# Patient Record
Sex: FEMALE | Race: WHITE | NOT HISPANIC OR LATINO
[De-identification: names, ages, dates, MRNs, and addresses within clinical notes are randomized per-mention and may not be internally consistent; named-entity substitution may affect disease eponyms.]

---

## 2021-08-24 ENCOUNTER — APPOINTMENT (OUTPATIENT)
Dept: OBGYN | Facility: CLINIC | Age: 51
End: 2021-08-24
Payer: COMMERCIAL

## 2021-08-24 ENCOUNTER — NON-APPOINTMENT (OUTPATIENT)
Age: 51
End: 2021-08-24

## 2021-08-24 VITALS — SYSTOLIC BLOOD PRESSURE: 118 MMHG | DIASTOLIC BLOOD PRESSURE: 78 MMHG | WEIGHT: 178 LBS

## 2021-08-24 PROCEDURE — 99396 PREV VISIT EST AGE 40-64: CPT

## 2021-08-24 NOTE — PLAN
[FreeTextEntry1] : 51 year y/o P1 presenting for annual exam\par -normal exam except for small right-sided hernia\par -unable to do internal exam today as patient has her period\par -Contraception: none\par -f/u PRN

## 2021-08-24 NOTE — PHYSICAL EXAM
[Appropriately responsive] : appropriately responsive [Alert] : alert [No Acute Distress] : no acute distress [No Lymphadenopathy] : no lymphadenopathy [Regular Rate Rhythm] : regular rate rhythm [No Murmurs] : no murmurs [Clear to Auscultation B/L] : clear to auscultation bilaterally [Soft] : soft [Non-tender] : non-tender [Non-distended] : non-distended [No HSM] : No HSM [No Lesions] : no lesions [Oriented x3] : oriented x3 [Examination Of The Breasts] : a normal appearance [No Masses] : no breast masses were palpable [Labia Majora] : normal [Labia Minora] : normal [Normal] : normal [Uterine Adnexae] : normal [FreeTextEntry7] : small right-sided hernia

## 2021-08-24 NOTE — HISTORY OF PRESENT ILLNESS
[Patient reported mammogram was normal] : Patient reported mammogram was normal [perimenopausal] : perimenopausal [N] : Patient reports normal menses [Frequency: Q ___ days] : menstrual periods occur approximately every [unfilled] days [No] : Patient does not have concerns regarding sex [Women] : women [Mammogramdate] : 6/2021 [TextBox_102] : 24-35 day cycle [MensesFreq] : 2 [PGxTotal] : 1 [[facility]] : [facility] [TextBox_6] : 08/24/21 [FreeTextEntry1] : 08/24/21

## 2021-09-23 ENCOUNTER — APPOINTMENT (OUTPATIENT)
Dept: OBGYN | Facility: CLINIC | Age: 51
End: 2021-09-23
Payer: COMMERCIAL

## 2021-09-23 VITALS — DIASTOLIC BLOOD PRESSURE: 83 MMHG | WEIGHT: 180 LBS | SYSTOLIC BLOOD PRESSURE: 110 MMHG

## 2021-09-23 DIAGNOSIS — Z78.9 OTHER SPECIFIED HEALTH STATUS: ICD-10-CM

## 2021-09-23 DIAGNOSIS — Z80.9 FAMILY HISTORY OF MALIGNANT NEOPLASM, UNSPECIFIED: ICD-10-CM

## 2021-09-23 DIAGNOSIS — Z86.018 PERSONAL HISTORY OF OTHER BENIGN NEOPLASM: ICD-10-CM

## 2021-09-23 DIAGNOSIS — Z82.49 FAMILY HISTORY OF ISCHEMIC HEART DISEASE AND OTHER DISEASES OF THE CIRCULATORY SYSTEM: ICD-10-CM

## 2021-09-23 DIAGNOSIS — K43.9 VENTRAL HERNIA W/OUT OBSTRUCTION OR GANGRENE: ICD-10-CM

## 2021-09-23 DIAGNOSIS — Z83.42 FAMILY HISTORY OF FAMILIAL HYPERCHOLESTEROLEMIA: ICD-10-CM

## 2021-09-23 DIAGNOSIS — Z83.79 FAMILY HISTORY OF OTHER DISEASES OF THE DIGESTIVE SYSTEM: ICD-10-CM

## 2021-09-23 PROCEDURE — XXXXX: CPT

## 2021-09-23 PROCEDURE — 99396 PREV VISIT EST AGE 40-64: CPT

## 2021-09-26 LAB
HPV 16 E6+E7 MRNA CVX QL NAA+PROBE: NOT DETECTED
HPV HIGH+LOW RISK DNA PNL CVX: NOT DETECTED
HPV18+45 E6+E7 MRNA CVX QL NAA+PROBE: NOT DETECTED

## 2021-09-29 LAB — CYTOLOGY CVX/VAG DOC THIN PREP: NORMAL

## 2021-10-29 ENCOUNTER — APPOINTMENT (OUTPATIENT)
Dept: TRAUMA SURGERY | Facility: HOSPITAL | Age: 51
End: 2021-10-29
Payer: COMMERCIAL

## 2021-10-29 ENCOUNTER — RESULT REVIEW (OUTPATIENT)
Age: 51
End: 2021-10-29

## 2021-10-29 VITALS
BODY MASS INDEX: 29.32 KG/M2 | SYSTOLIC BLOOD PRESSURE: 111 MMHG | HEART RATE: 72 BPM | WEIGHT: 176 LBS | DIASTOLIC BLOOD PRESSURE: 68 MMHG | TEMPERATURE: 98.1 F | HEIGHT: 65 IN

## 2021-10-29 PROCEDURE — 99214 OFFICE O/P EST MOD 30 MIN: CPT

## 2021-10-29 NOTE — PLAN
[FreeTextEntry1] : Mrs. Guajardo presents with complaints consistent with a ventral hernia likely due to her prior  or congenital weakness. I outlined the the risks and benefits of a diagnostic laparoscopy and hernia repair w/ mesh following CT abd/pel. I described bleeding, infection requiring mesh removal and possible nerve injury.  I also outlined the alternative of waiting/watching for more serious side effects however she says this discomfort has been limiting her ability to safely exercise and she is afraid this will become a large problem in the future.   I agree with her in that this should be fixed ASAP so to avoid complications of hernia as well fixing this while it is as small as possible. I outline the use of mesh as well. \par Mrs. Guajardo is agreeable to surgery so we will go ahead with pre-op imaging and will schedule surgery for the end of November or beginning of December. All questions were answered at this time. \par \par I spent 35min reviewing data,and information. Greater than 50% of my time was spent in face to face discussion regarding wound healing, postoperative activity and surgical planning. \par \par Velasquez Dueñas MD\par Acute Care Surgery\par

## 2021-10-29 NOTE — PHYSICAL EXAM
[de-identified] : Well ,comfortable. [de-identified] : Soft, NT, ND. No palpable ventral defect on coughing. No femoral/inguinal hernia felt. C section scar well healed, no palpable defect over this location.

## 2021-10-29 NOTE — HISTORY OF PRESENT ILLNESS
[de-identified] : Mrs. Guajardo is a pleasant 51 F with no pertinent past medical history and a past surgical history of a  11 years ago who presents with chronic right sided discomfort most pronounced when supine and while coughing.  She notice a bulge over her right side and felt relief by raising her legs to her abdomen. This was not associated with nausea/vomiting. She denies any other associated pain or symptom. She had a normal colonoscopy 2 years ago and has received both of her COVID vaccinations.

## 2021-11-24 ENCOUNTER — OUTPATIENT (OUTPATIENT)
Dept: OUTPATIENT SERVICES | Facility: HOSPITAL | Age: 51
LOS: 1 days | End: 2021-11-24
Payer: COMMERCIAL

## 2021-11-24 ENCOUNTER — APPOINTMENT (OUTPATIENT)
Dept: CT IMAGING | Facility: IMAGING CENTER | Age: 51
End: 2021-11-24
Payer: COMMERCIAL

## 2021-11-24 ENCOUNTER — TRANSCRIPTION ENCOUNTER (OUTPATIENT)
Age: 51
End: 2021-11-24

## 2021-11-24 DIAGNOSIS — Z00.8 ENCOUNTER FOR OTHER GENERAL EXAMINATION: ICD-10-CM

## 2021-11-24 PROCEDURE — 74177 CT ABD & PELVIS W/CONTRAST: CPT

## 2021-11-24 PROCEDURE — 74177 CT ABD & PELVIS W/CONTRAST: CPT | Mod: 26

## 2021-11-29 ENCOUNTER — NON-APPOINTMENT (OUTPATIENT)
Age: 51
End: 2021-11-29

## 2022-01-06 ENCOUNTER — APPOINTMENT (OUTPATIENT)
Dept: ANTEPARTUM | Facility: CLINIC | Age: 52
End: 2022-01-06
Payer: COMMERCIAL

## 2022-01-06 ENCOUNTER — APPOINTMENT (OUTPATIENT)
Dept: OBGYN | Facility: CLINIC | Age: 52
End: 2022-01-06
Payer: COMMERCIAL

## 2022-01-06 VITALS
WEIGHT: 178 LBS | SYSTOLIC BLOOD PRESSURE: 125 MMHG | BODY MASS INDEX: 29.66 KG/M2 | DIASTOLIC BLOOD PRESSURE: 75 MMHG | HEIGHT: 65 IN

## 2022-01-06 PROCEDURE — 76376 3D RENDER W/INTRP POSTPROCES: CPT

## 2022-01-06 PROCEDURE — 76830 TRANSVAGINAL US NON-OB: CPT

## 2022-01-06 PROCEDURE — 76857 US EXAM PELVIC LIMITED: CPT

## 2022-01-06 PROCEDURE — ZZZZZ: CPT

## 2022-01-29 PROBLEM — Z82.49 FH: HTN (HYPERTENSION): Status: ACTIVE | Noted: 2021-07-22

## 2022-01-29 PROBLEM — Z82.49 FHX: HEART DISEASE: Status: ACTIVE | Noted: 2021-07-22

## 2022-01-29 PROBLEM — Z83.42 FAMILY HISTORY OF HYPERCHOLESTEROLEMIA: Status: ACTIVE | Noted: 2022-01-29

## 2022-01-29 PROBLEM — Z80.9 FAMILY HISTORY OF MALIGNANT NEOPLASM: Status: ACTIVE | Noted: 2021-07-22

## 2022-01-29 PROBLEM — Z83.79 FAMILY HISTORY OF LIVER DISEASE: Status: ACTIVE | Noted: 2022-01-29

## 2022-01-29 PROBLEM — Z86.018 HISTORY OF FIBROIDS: Status: RESOLVED | Noted: 2022-01-29 | Resolved: 2022-01-29

## 2022-01-29 PROBLEM — Z82.49 FAMILY HISTORY OF HYPERTENSION: Status: ACTIVE | Noted: 2022-01-29

## 2022-01-29 PROBLEM — K43.9 VENTRAL HERNIA WITHOUT OBSTRUCTION OR GANGRENE: Status: RESOLVED | Noted: 2021-10-29 | Resolved: 2022-01-29

## 2022-01-29 PROBLEM — Z82.49 FAMILY HISTORY OF CARDIAC DISORDER: Status: ACTIVE | Noted: 2022-01-29

## 2022-01-29 PROBLEM — Z78.9 NON-SMOKER: Status: ACTIVE | Noted: 2021-07-22

## 2022-01-29 NOTE — HISTORY OF PRESENT ILLNESS
[FreeTextEntry1] : Patient is a 51 year old female who presents today in the office for follow up  exam and PAP smear. Patient was having her period during her annual visit last month and returns today for speculum exam, including HPV/PAP.

## 2022-01-29 NOTE — PLAN
[FreeTextEntry1] : 51 year old presenting for follow up visit for PAP smear.\par -uterus is normal sized and mobile\par -f/u PAP done today, patient declines GC/CT\par -on unofficial ultrasound today, patient noted to have a left-sided, echogenic fibroid within the uterine wall\par -f/u PRN for official GYN ultrasound to evaluate further\par \par =============================================\par I, Argelia Guillory, acted solely as a scribe for Dr. Misael Elizabeth on Sep 23 2021  9:30AM. All medical entries made by the Scribe were at my, Dr. Misael Elizabeth's, direction and personally dictated by me on Sep 23 2021  9:30AM . I have reviewed the chart and agree that the record accurately reflects my personal performance of the history, physical exam, assessment, and plan. I have also personally directed, reviewed, and agreed with the chart.\par =============================================\par

## 2022-01-29 NOTE — PHYSICAL EXAM
[Appropriately responsive] : appropriately responsive [Alert] : alert [No Acute Distress] : no acute distress [No Lymphadenopathy] : no lymphadenopathy [Regular Rate Rhythm] : regular rate rhythm [No Murmurs] : no murmurs [Clear to Auscultation B/L] : clear to auscultation bilaterally [Soft] : soft [Non-tender] : non-tender [Non-distended] : non-distended [No HSM] : No HSM [No Lesions] : no lesions [No Mass] : no mass [Oriented x3] : oriented x3 [Labia Majora] : normal [Labia Minora] : normal [Discharge] :  ~M urethral discharge [Uterine Adnexae] : normal [Normal] : normal [Anteversion] : anteverted [Tenderness] : nontender [Enlarged ___ wks] : not enlarged [Mass ___ cm] : no uterine mass was palpated

## 2024-05-29 ENCOUNTER — APPOINTMENT (OUTPATIENT)
Dept: OBGYN | Facility: CLINIC | Age: 54
End: 2024-05-29

## 2024-05-29 VITALS — SYSTOLIC BLOOD PRESSURE: 117 MMHG | DIASTOLIC BLOOD PRESSURE: 81 MMHG | BODY MASS INDEX: 29.79 KG/M2 | WEIGHT: 179 LBS

## 2024-05-29 DIAGNOSIS — Z00.00 ENCOUNTER FOR GENERAL ADULT MEDICAL EXAMINATION W/OUT ABNORMAL FINDINGS: ICD-10-CM

## 2024-05-29 DIAGNOSIS — Z01.419 ENCOUNTER FOR GYNECOLOGICAL EXAMINATION (GENERAL) (ROUTINE) W/OUT ABNORMAL FINDINGS: ICD-10-CM

## 2024-05-29 PROCEDURE — 99386 PREV VISIT NEW AGE 40-64: CPT

## 2024-05-29 NOTE — HISTORY OF PRESENT ILLNESS
[perimenopausal] : perimenopausal [N] : Patient does not use contraception [Monogamous (Female Partner)] : is monogamous with a female partner [Y] : Positive pregnancy history [Mammogramdate] : 2023 [LMPDate] : 09/2023 [PGxTotal] : 1 [[facility]] : 1 [PGHxPremature] : 0 [PGHxAbortions] : 0 [[facility] Campusiving] : 1 [FreeTextEntry1] : C/Sx1  [No] : Patient does not have concerns regarding sex [Currently Active] : currently active [Women] : women

## 2024-05-29 NOTE — PLAN
[FreeTextEntry1] : 53 year  y/o P1 presenting for annual exam -f/u pap and GC/CT -Requisition given for mammogram and breast U/S -Contraception: none, female partner  - discussed menopausal symptoms relief and that one full  year without VB is considered menopause. Discussed returning to office if VB once a year from her last menses.  -f/u PRN

## 2024-05-29 NOTE — COUNSELING
[Nutrition/ Exercise/ Weight Management] : nutrition, exercise, weight management [Body Image] : body image [Vitamins/Supplements] : vitamins/supplements [Breast Self Exam] : breast self exam [Bladder Hygiene] : bladder hygiene [Other ___] : [unfilled]

## 2024-06-02 LAB
C TRACH RRNA SPEC QL NAA+PROBE: NOT DETECTED
HPV 16 E6+E7 MRNA CVX QL NAA+PROBE: NOT DETECTED
HPV HIGH+LOW RISK DNA PNL CVX: NOT DETECTED
HPV18+45 E6+E7 MRNA CVX QL NAA+PROBE: NOT DETECTED
N GONORRHOEA RRNA SPEC QL NAA+PROBE: NOT DETECTED
SOURCE AMPLIFICATION: NORMAL

## 2024-06-05 LAB — CYTOLOGY CVX/VAG DOC THIN PREP: NORMAL
